# Patient Record
(demographics unavailable — no encounter records)

---

## 2025-03-23 NOTE — PHYSICAL EXAM
[de-identified] : Examination of the right foot and ankle is as follows: Inspection: swelling, ecchymosis of foot and ankle, moderate swelling of dorsal foot and lateral ankle, but no abrasion, laceration, no erythema Palpation: distal 1/3 fibula shaft tenderness, lateral ligament tenderness ROM: plantarflexion 20 degrees, inversion 15 degrees, eversion 10 degrees, dorsiflexion 10 degrees Strength: dorsiflexion 4/5, plantar flexion 4/5, inversion 4/5, eversion 4/5, EHL 5/5, FHL 5/5 Vascular: dorsalis pedis pulse: 2+, posterior tibialis pulse: 2+ Neuro: Sensation present to light touch in all distributions Gait: antalgic, RLE short ankle air cast, axillary cruthces   X-rays of the right ankle is as follows: Ankle 3 view AP/Lateral/Oblique: nondisplaced, comminuted distal 1/3 fibula shaft fracture above the level of the syndesmosis with medial clear space widening

## 2025-03-23 NOTE — ASSESSMENT
[FreeTextEntry1] : 36 yo female presenting today with left closed, comminuted nondisplaced Rico C lateral malleolus fracture with medial clear space widening. -Discussed with patient risks, benefits, alternatives of ORIF left lateral malleolus fracture with surgical fixation of syndesmotic disruption. -Risks, benefits, and alternatives of surgery discussed with patient (and family). Risks including but not limited to infection, blood loss, tendon damage, muscle damage, nerve damage, stiffness, pain, arthritis, malunion, nonunion, loss of function, potential for secondary surgery, loss of limb or life. Patient understands and was allowed to voice questions or concerns. They agree to surgery. -Rx knee scooter, rx wheelchair -patient is an everyday smoker, discussed with patient that smoking limits bone healing and that she is at increased risk of non-union, understanding expressed -Avoid strenuous/impact related activities, advised to elevate to decrease risk of anjel-op infections -Rest, ice, compression, elevation, NSAIDs PRN for pain.  -All questions answered -F/u after surgery  The diagnosis was explained in detail. The potential non-surgical and surgical treatments were reviewed. The relative risks and benefits of each option were considered relative to the patients age, activity level, medical history, symptom severity and previously attempted treatments.  The patient was advised to consult with their primary medical provider prior to initiation of any new medications to reduce the risk of adverse effects specific to their long-term home medications and medical history. The risk of gastrointestinal irritation and kidney injury specific to long-term NSAID use was discussed.  Entered by Jayy Spears PA-C acting as scribe. Dr. Cespedes Attestation The documentation recorded by the scribe, in my presence, accurately reflects the service I, Dr. Cespedes, personally performed, and the decisions made by me with my edits as appropriate.

## 2025-03-23 NOTE — HISTORY OF PRESENT ILLNESS
[Sudden] : sudden [6] : 6 [Dull/Aching] : dull/aching [Throbbing] : throbbing [Intermittent] : intermittent [Household chores] : household chores [Leisure] : leisure [Social interactions] : social interactions [Rest] : rest [Full time] : Work status: full time [de-identified] : Patient is here today for the right ankle. Patient states she missed a step at home and fell on 3/13/25. Patient went to [facility], had x-rays and was informed it was fractured. Patient states she was wrapped in an ace bandage, given an aircast, and was provided with crutches. Patient states her pain is along the lateral aspect of the ankle, denies numbness/tingling. Patient states she feels increased pain with weight bearing. Patient reports taking Tylenol PRN for pain with relief. Patient came into the office in an aircast and NWB with crutches.  [] : no [FreeTextEntry1] : right  [FreeTextEntry3] : 3/13/25 [de-identified] : movement [de-identified] : Naila

## 2025-03-23 NOTE — PHYSICAL EXAM
Yes [de-identified] : Examination of the right foot and ankle is as follows: Inspection: swelling, ecchymosis of foot and ankle, moderate swelling of dorsal foot and lateral ankle, but no abrasion, laceration, no erythema Palpation: distal 1/3 fibula shaft tenderness, lateral ligament tenderness ROM: plantarflexion 20 degrees, inversion 15 degrees, eversion 10 degrees, dorsiflexion 10 degrees Strength: dorsiflexion 4/5, plantar flexion 4/5, inversion 4/5, eversion 4/5, EHL 5/5, FHL 5/5 Vascular: dorsalis pedis pulse: 2+, posterior tibialis pulse: 2+ Neuro: Sensation present to light touch in all distributions Gait: antalgic, RLE short ankle air cast, axillary cruthces   X-rays of the right ankle is as follows: Ankle 3 view AP/Lateral/Oblique: nondisplaced, comminuted distal 1/3 fibula shaft fracture above the level of the syndesmosis with medial clear space widening

## 2025-03-23 NOTE — HISTORY OF PRESENT ILLNESS
[Sudden] : sudden [6] : 6 [Dull/Aching] : dull/aching [Throbbing] : throbbing [Intermittent] : intermittent [Household chores] : household chores [Leisure] : leisure [Social interactions] : social interactions [Rest] : rest [Full time] : Work status: full time [de-identified] : Patient is here today for the right ankle. Patient states she missed a step at home and fell on 3/13/25. Patient went to [facility], had x-rays and was informed it was fractured. Patient states she was wrapped in an ace bandage, given an aircast, and was provided with crutches. Patient states her pain is along the lateral aspect of the ankle, denies numbness/tingling. Patient states she feels increased pain with weight bearing. Patient reports taking Tylenol PRN for pain with relief. Patient came into the office in an aircast and NWB with crutches.  [] : no [FreeTextEntry1] : right  [FreeTextEntry3] : 3/13/25 [de-identified] : movement [de-identified] : Naila

## 2025-03-23 NOTE — ASSESSMENT
[FreeTextEntry1] : 34 yo female presenting today with left closed, comminuted nondisplaced Rico C lateral malleolus fracture with medial clear space widening. -Discussed with patient risks, benefits, alternatives of ORIF left lateral malleolus fracture with surgical fixation of syndesmotic disruption. -Risks, benefits, and alternatives of surgery discussed with patient (and family). Risks including but not limited to infection, blood loss, tendon damage, muscle damage, nerve damage, stiffness, pain, arthritis, malunion, nonunion, loss of function, potential for secondary surgery, loss of limb or life. Patient understands and was allowed to voice questions or concerns. They agree to surgery. -Rx knee scooter, rx wheelchair -patient is an everyday smoker, discussed with patient that smoking limits bone healing and that she is at increased risk of non-union, understanding expressed -Avoid strenuous/impact related activities, advised to elevate to decrease risk of anjel-op infections -Rest, ice, compression, elevation, NSAIDs PRN for pain.  -All questions answered -F/u after surgery  The diagnosis was explained in detail. The potential non-surgical and surgical treatments were reviewed. The relative risks and benefits of each option were considered relative to the patients age, activity level, medical history, symptom severity and previously attempted treatments.  The patient was advised to consult with their primary medical provider prior to initiation of any new medications to reduce the risk of adverse effects specific to their long-term home medications and medical history. The risk of gastrointestinal irritation and kidney injury specific to long-term NSAID use was discussed.  Entered by Jayy Spears PA-C acting as scribe. Dr. Cespedes Attestation The documentation recorded by the scribe, in my presence, accurately reflects the service I, Dr. Cespedes, personally performed, and the decisions made by me with my edits as appropriate.

## 2025-04-03 NOTE — PHYSICAL EXAM
[de-identified] : Examination of the right foot and ankle is as follows: Inspection: swelling, ecchymosis of foot and ankle, moderate swelling of dorsal foot and lateral ankle, but no abrasion, laceration, no erythema Palpation: lateral malleolus tenderness, lateral ligament tenderness ROM: plantarflexion 20 degrees, inversion 15 degrees, eversion 10 degrees, dorsiflexion 10 degrees Strength: dorsiflexion 4/5, plantar flexion 4/5, inversion 4/5, eversion 4/5, EHL 5/5, FHL 5/5 Vascular: dorsalis pedis pulse: 2+, posterior tibialis pulse: 2+ Neuro: Sensation present to light touch in all distributions Gait: antalgic, ambulation with crutches   X-rays of the right ankle is as follows: Ankle 3 view AP/Lateral/Oblique: nondisplaced, lateral malleolus fracture above the level of the syndesmosis, no medial clear space widening, stress view negative for medial clear space widening
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
## 2025-05-01 NOTE — ASSESSMENT
[FreeTextEntry1] : 35yoF s/p ORIF right ankle lateral malleolus fracture on 3/24/25.  Doing well.  Pain well managed on current meds regiment.   -RLE WBAT In short cam boot, may ween off crutches to her tolerance -Rx PT renewed today -Continue to avoid strenuous/impact related activities -Rest, ice, compression, elevation, NSAIDs PRN for pain. -All questions answered -F/u 7 weeks with repeat ankle x-rays.   The diagnosis was explained in detail. The potential non-surgical and surgical treatments were reviewed. The relative risks and benefits of each option were considered relative to the patients age, activity level, medical history, symptom severity and previously attempted treatments.   The patient was advised to consult with their primary medical provider prior to initiation of any new medications to reduce the risk of adverse effects specific to their long-term home medications and medical history.   The patient's current medications management of their orthopedic diagnosis was reviewed today:   1) We discussed a comprehensive treatment plan that included possible pharmaceutical management involving the use of prescription strength medications including but not limited to options as oral Naprosyn 500mg BID, once daily Meloxicam 15 mg, or 500-650 mg Tylenol versus over the counter oral medications and topical prescriptions NSAID Pennsaid vs over the counter Voltaren gel.  2) There is a moderate risk of morbidity with further treatment, especially from use of prescription strength medications and possible side effects of these medications which include upset stomach with oral medications, skin reactions to topical medications and cardiac/renal issues with long term use.  3) I recommended that hte patient follow-up with their medical physician to discuss any significant specific potential issues with long term medication use such as interactions with current medications or with exacerbation of underlying medical comorbidities.  4) The benefits and risks associated with use of injectable, oral or topical, prescription and over the counter anti-inflammatory medications were discussed with the patient. The patient voiced understanding of the risks including but not limited to bleeding, stroke, kidney dysfunction, heart disease, and were referred to the black box warning label for further information  Entered by Jayy Spears PA-C acting as scribe. Dr. Cespedes Attestation The documentation recorded by the scribe, in my presence, accurately reflects the service I, Dr. Cespedes, personally performed, and the decisions made by me with my edits as appropriate.

## 2025-05-01 NOTE — PHYSICAL EXAM
[de-identified] : Examination of the right foot and ankle is as follows: Inspection: well healed lateral scar, no erythema, moderate swelling of dorsal foot and lateral ankle, but no abrasion, laceration, no erythema, no ecchymosis Palpation: mild lateral malleolus tenderness, no lateral ligament tenderness ROM: plantarflexion 20 degrees, inversion 15 degrees, eversion 10 degrees, dorsiflexion 10 degrees Strength: dorsiflexion 4/5, plantar flexion 4/5, inversion 4/5, eversion 4/5, EHL 5/5, FHL 5/5 Vascular: dorsalis pedis pulse: 2+, posterior tibialis pulse: 2+ Neuro: Sensation present to light touch in all distributions Gait: antalgic, ambulation with crutches, RLE short cam boot   X-rays of the right ankle is as follows: Ankle 3 view AP/Lateral/Oblique: s/p ORIF lateral malleolus fracture, fracture and hardware are in proper position and alignment with increased callous formation at fracture site

## 2025-05-01 NOTE — HISTORY OF PRESENT ILLNESS
[Sudden] : sudden [6] : 6 [Dull/Aching] : dull/aching [Throbbing] : throbbing [Intermittent] : intermittent [Household chores] : household chores [Leisure] : leisure [Social interactions] : social interactions [Rest] : rest [Full time] : Work status: full time [de-identified] : Patient is here today for the right ankle, s/p ORIF Lateral malleolus 3/24/25. Patient states she has been going to PT 2x a week. Patient came into office NWB with crutches and cam boot. Patient states she has no pain. Patient states she is not taking anything for pain at this time.  [] : no [FreeTextEntry1] : Righ ankle  [FreeTextEntry3] : 3/13/25 [de-identified] : movement [de-identified] : Naila [de-identified] : 3/24/25 [de-identified] : ORIF Lateral malleolus

## 2025-06-19 NOTE — ASSESSMENT
[FreeTextEntry1] : 35yoF s/p ORIF right ankle lateral malleolus fracture on 3/24/25.  Doing well.  Reports she is pleased with her surgical outcome. -RLE WBAT and DC camboot -Rx PT renewed today -activities as tolerated -Rest, ice, compression, elevation, NSAIDs PRN for pain. -All questions answered -F/u 3 months with repeat ankle x-rays.   The diagnosis was explained in detail. The potential non-surgical and surgical treatments were reviewed. The relative risks and benefits of each option were considered relative to the patients age, activity level, medical history, symptom severity and previously attempted treatments.   The patient was advised to consult with their primary medical provider prior to initiation of any new medications to reduce the risk of adverse effects specific to their long-term home medications and medical history.   The patient's current medications management of their orthopedic diagnosis was reviewed today:   1) We discussed a comprehensive treatment plan that included possible pharmaceutical management involving the use of prescription strength medications including but not limited to options as oral Naprosyn 500mg BID, once daily Meloxicam 15 mg, or 500-650 mg Tylenol versus over the counter oral medications and topical prescriptions NSAID Pennsaid vs over the counter Voltaren gel.  2) There is a moderate risk of morbidity with further treatment, especially from use of prescription strength medications and possible side effects of these medications which include upset stomach with oral medications, skin reactions to topical medications and cardiac/renal issues with long term use.  3) I recommended that hte patient follow-up with their medical physician to discuss any significant specific potential issues with long term medication use such as interactions with current medications or with exacerbation of underlying medical comorbidities.  4) The benefits and risks associated with use of injectable, oral or topical, prescription and over the counter anti-inflammatory medications were discussed with the patient. The patient voiced understanding of the risks including but not limited to bleeding, stroke, kidney dysfunction, heart disease, and were referred to the black box warning label for further information  Entered by Jayy Spears PA-C acting as scribe. Dr. Cespedes Attestation The documentation recorded by the scribe, in my presence, accurately reflects the service I, Dr. Cespedes, personally performed, and the decisions made by me with my edits as appropriate.

## 2025-06-19 NOTE — PHYSICAL EXAM
[de-identified] : Examination of the right foot and ankle is as follows: Inspection: well healed lateral scar, no erythema, moderate swelling of dorsal foot and lateral ankle, but no abrasion, laceration, no erythema, no ecchymosis Palpation: no lateral malleolus tenderness, no lateral ligament tenderness ROM: plantarflexion 40 degrees, inversion 30 degrees, eversion 20 degrees, dorsiflexion 10 degrees Strength: dorsiflexion 4/5, plantar flexion 4/5, inversion 4/5, eversion 4/5, EHL 5/5, FHL 5/5 Vascular: dorsalis pedis pulse: 2+, posterior tibialis pulse: 2+ Neuro: Sensation present to light touch in all distributions Gait: normal gait   X-rays of the right ankle is as follows: Ankle 3 view AP/Lateral/Oblique: s/p ORIF lateral malleolus fracture, fracture and hardware are in proper position and alignment with healed fracture

## 2025-06-19 NOTE — HISTORY OF PRESENT ILLNESS
[Sudden] : sudden [6] : 6 [Dull/Aching] : dull/aching [Throbbing] : throbbing [Intermittent] : intermittent [Household chores] : household chores [Leisure] : leisure [Social interactions] : social interactions [Rest] : rest [Full time] : Work status: full time [de-identified] : Patient is here today for the right ankle, s/p ORIF Lateral malleolus 3/24/25. Patient states she has been going to PT 2x a week and doing HEP. Patient came into office WB with cam corinaot. Patient states she has minimal lateral ankle discomfort at this time. Patient states she is not taking anything for pain at this time.  [] : no [FreeTextEntry1] : Righ ankle  [FreeTextEntry3] : 3/13/25 [de-identified] : movement [de-identified] : Naila [de-identified] : 3/24/25 [de-identified] : ORIF Lateral malleolus